# Patient Record
Sex: MALE | Race: WHITE | NOT HISPANIC OR LATINO | Employment: FULL TIME | URBAN - METROPOLITAN AREA
[De-identification: names, ages, dates, MRNs, and addresses within clinical notes are randomized per-mention and may not be internally consistent; named-entity substitution may affect disease eponyms.]

---

## 2018-03-09 ENCOUNTER — OFFICE VISIT (OUTPATIENT)
Dept: FAMILY MEDICINE CLINIC | Facility: CLINIC | Age: 38
End: 2018-03-09

## 2018-03-09 VITALS
OXYGEN SATURATION: 99 % | WEIGHT: 170 LBS | BODY MASS INDEX: 23.8 KG/M2 | DIASTOLIC BLOOD PRESSURE: 80 MMHG | SYSTOLIC BLOOD PRESSURE: 120 MMHG | TEMPERATURE: 98.5 F | HEIGHT: 71 IN | HEART RATE: 85 BPM

## 2018-03-09 DIAGNOSIS — R03.0 ELEVATED BLOOD PRESSURE READING: Primary | ICD-10-CM

## 2018-03-09 PROBLEM — J32.9 CHRONIC SINUSITIS: Status: ACTIVE | Noted: 2018-03-09

## 2018-03-09 PROBLEM — J30.1 HAY FEVER: Status: ACTIVE | Noted: 2018-03-09

## 2018-03-09 PROBLEM — J30.9 ATOPIC RHINITIS: Status: ACTIVE | Noted: 2018-03-09

## 2018-03-09 PROCEDURE — 99213 OFFICE O/P EST LOW 20 MIN: CPT | Performed by: FAMILY MEDICINE

## 2018-03-09 RX ORDER — METOPROLOL SUCCINATE 25 MG/1
25 TABLET, EXTENDED RELEASE ORAL DAILY
Qty: 30 TABLET | Refills: 1 | Status: SHIPPED | OUTPATIENT
Start: 2018-03-09

## 2018-03-09 RX ORDER — PANTOPRAZOLE SODIUM 40 MG/1
TABLET, DELAYED RELEASE ORAL
COMMUNITY
Start: 2018-03-08

## 2018-03-09 NOTE — PROGRESS NOTES
Subjective   Taz Mcdonald is a 37 y.o. male who is here for   Chief Complaint   Patient presents with   • Follow-up     ER   • Chest Pain   • Hypertension   .   Had a nice neg w/u at [place] [facility]  Labs-  Cxr -  ekg -   Tele -  Troponin - x 2.  Bp up and down.  GI cocktail given.      History of Present Illness     Chest Pain: Patient complains of chest pain. Onset was 1 week ago, with improving course since that time. The patient describes the pain as intermittent, sharp and substernal in nature, does not radiate. Patient rates pain as a 5/10 in intensity.  Associated symptoms are chest pressure/discomfort. Aggravating factors are none.  Alleviating factors are: antacids and rest. Patient's cardiac risk factors are none.  Patient's risk factors for DVT/PE: none. Previous cardiac testing: chest x-ray, echocardiogram, kidney function and potassium.  Lots of stress at work.        The following portions of the patient's history were reviewed and updated as appropriate: allergies, current medications, past family history, past medical history, past social history, past surgical history and problem list.    Review of Systems    Objective   Physical Exam   Constitutional: He appears well-developed and well-nourished.   Cardiovascular: Normal rate and regular rhythm.    Pulmonary/Chest: Effort normal and breath sounds normal.   Abdominal: Soft. There is no tenderness.   Nursing note and vitals reviewed.      Assessment/Plan   Taz was seen today for follow-up, chest pain and hypertension.    Diagnoses and all orders for this visit:    Elevated blood pressure reading  -     metoprolol succinate XL (TOPROL XL) 25 MG 24 hr tablet; Take 1 tablet by mouth Daily.      Patient Instructions   A trial of toprol to calm him and heart down  Start exercise for emotional stress relief      There are no discontinued medications.     Return in about 1 month (around 4/9/2018) for blood pressure, new medication follow up.    Dr. Herrera  [place]  [place], [place]